# Patient Record
Sex: MALE | Race: WHITE | Employment: OTHER | URBAN - METROPOLITAN AREA
[De-identification: names, ages, dates, MRNs, and addresses within clinical notes are randomized per-mention and may not be internally consistent; named-entity substitution may affect disease eponyms.]

---

## 2017-02-18 PROBLEM — E87.20 LACTIC ACIDOSIS: Status: ACTIVE | Noted: 2017-02-18

## 2017-02-18 PROBLEM — K52.9 GASTROENTERITIS: Status: ACTIVE | Noted: 2017-02-18

## 2017-02-18 PROBLEM — E11.9 DIABETES (HCC): Status: ACTIVE | Noted: 2017-02-18

## 2017-02-18 PROBLEM — E83.42 HYPOMAGNESEMIA: Status: ACTIVE | Noted: 2017-02-18

## 2017-02-18 PROBLEM — D72.829 LEUKOCYTOSIS: Status: ACTIVE | Noted: 2017-02-18

## 2017-02-18 PROBLEM — E87.6 HYPOKALEMIA: Status: ACTIVE | Noted: 2017-02-18

## 2018-09-03 ENCOUNTER — HOSPITAL ENCOUNTER (INPATIENT)
Age: 73
LOS: 2 days | Discharge: HOME | DRG: 392 | End: 2018-09-06
Attending: EMERGENCY MEDICINE | Admitting: EMERGENCY MEDICINE
Payer: MEDICARE

## 2018-09-03 ENCOUNTER — APPOINTMENT (OUTPATIENT)
Dept: CT IMAGING | Age: 73
DRG: 392 | End: 2018-09-03
Payer: MEDICARE

## 2018-09-03 ENCOUNTER — APPOINTMENT (OUTPATIENT)
Dept: GENERAL RADIOLOGY | Age: 73
DRG: 392 | End: 2018-09-03
Payer: MEDICARE

## 2018-09-03 DIAGNOSIS — R11.14 BILIOUS VOMITING WITH NAUSEA: ICD-10-CM

## 2018-09-03 DIAGNOSIS — R14.0 ABDOMINAL DISTENTION: Primary | ICD-10-CM

## 2018-09-03 LAB
ABSOLUTE EOS #: 0.41 K/UL (ref 0–0.44)
ABSOLUTE IMMATURE GRANULOCYTE: <0.03 K/UL (ref 0–0.3)
ABSOLUTE LYMPH #: 1.51 K/UL (ref 1.1–3.7)
ABSOLUTE MONO #: 0.35 K/UL (ref 0.1–1.2)
ALBUMIN SERPL-MCNC: 4.2 G/DL (ref 3.5–5.2)
ALBUMIN/GLOBULIN RATIO: 1.4 (ref 1–2.5)
ALP BLD-CCNC: 100 U/L (ref 40–129)
ALT SERPL-CCNC: 29 U/L (ref 5–41)
ANION GAP SERPL CALCULATED.3IONS-SCNC: 16 MMOL/L (ref 9–17)
AST SERPL-CCNC: 27 U/L
BASOPHILS # BLD: 1 % (ref 0–2)
BASOPHILS ABSOLUTE: 0.06 K/UL (ref 0–0.2)
BILIRUB SERPL-MCNC: 1 MG/DL (ref 0.3–1.2)
BILIRUBIN DIRECT: 0.34 MG/DL
BILIRUBIN, INDIRECT: 0.66 MG/DL (ref 0–1)
BUN BLDV-MCNC: 8 MG/DL (ref 8–23)
BUN/CREAT BLD: ABNORMAL (ref 9–20)
CALCIUM SERPL-MCNC: 10 MG/DL (ref 8.6–10.4)
CHLORIDE BLD-SCNC: 101 MMOL/L (ref 98–107)
CO2: 23 MMOL/L (ref 20–31)
CREAT SERPL-MCNC: 0.55 MG/DL (ref 0.7–1.2)
DIFFERENTIAL TYPE: ABNORMAL
EOSINOPHILS RELATIVE PERCENT: 4 % (ref 1–4)
GFR AFRICAN AMERICAN: >60 ML/MIN
GFR NON-AFRICAN AMERICAN: >60 ML/MIN
GFR SERPL CREATININE-BSD FRML MDRD: ABNORMAL ML/MIN/{1.73_M2}
GFR SERPL CREATININE-BSD FRML MDRD: ABNORMAL ML/MIN/{1.73_M2}
GLOBULIN: ABNORMAL G/DL (ref 1.5–3.8)
GLUCOSE BLD-MCNC: 216 MG/DL (ref 70–99)
HCT VFR BLD CALC: 44.6 % (ref 40.7–50.3)
HEMOGLOBIN: 14.1 G/DL (ref 13–17)
IMMATURE GRANULOCYTES: 0 %
LIPASE: 26 U/L (ref 13–60)
LYMPHOCYTES # BLD: 14 % (ref 24–43)
MCH RBC QN AUTO: 25.4 PG (ref 25.2–33.5)
MCHC RBC AUTO-ENTMCNC: 31.6 G/DL (ref 28.4–34.8)
MCV RBC AUTO: 80.2 FL (ref 82.6–102.9)
MONOCYTES # BLD: 3 % (ref 3–12)
NRBC AUTOMATED: 0 PER 100 WBC
PDW BLD-RTO: 17.2 % (ref 11.8–14.4)
PLATELET # BLD: 218 K/UL (ref 138–453)
PLATELET ESTIMATE: ABNORMAL
PMV BLD AUTO: 10.1 FL (ref 8.1–13.5)
POTASSIUM SERPL-SCNC: 3.4 MMOL/L (ref 3.7–5.3)
RBC # BLD: 5.56 M/UL (ref 4.21–5.77)
RBC # BLD: ABNORMAL 10*6/UL
SEG NEUTROPHILS: 78 % (ref 36–65)
SEGMENTED NEUTROPHILS ABSOLUTE COUNT: 8.79 K/UL (ref 1.5–8.1)
SODIUM BLD-SCNC: 140 MMOL/L (ref 135–144)
TOTAL PROTEIN: 7.3 G/DL (ref 6.4–8.3)
WBC # BLD: 11.1 K/UL (ref 3.5–11.3)
WBC # BLD: ABNORMAL 10*3/UL

## 2018-09-03 PROCEDURE — 96374 THER/PROPH/DIAG INJ IV PUSH: CPT

## 2018-09-03 PROCEDURE — G0378 HOSPITAL OBSERVATION PER HR: HCPCS

## 2018-09-03 PROCEDURE — 80048 BASIC METABOLIC PNL TOTAL CA: CPT

## 2018-09-03 PROCEDURE — 6370000000 HC RX 637 (ALT 250 FOR IP): Performed by: EMERGENCY MEDICINE

## 2018-09-03 PROCEDURE — 74177 CT ABD & PELVIS W/CONTRAST: CPT

## 2018-09-03 PROCEDURE — 6360000002 HC RX W HCPCS: Performed by: STUDENT IN AN ORGANIZED HEALTH CARE EDUCATION/TRAINING PROGRAM

## 2018-09-03 PROCEDURE — 6360000004 HC RX CONTRAST MEDICATION: Performed by: STUDENT IN AN ORGANIZED HEALTH CARE EDUCATION/TRAINING PROGRAM

## 2018-09-03 PROCEDURE — 99285 EMERGENCY DEPT VISIT HI MDM: CPT

## 2018-09-03 PROCEDURE — 80076 HEPATIC FUNCTION PANEL: CPT

## 2018-09-03 PROCEDURE — 86140 C-REACTIVE PROTEIN: CPT

## 2018-09-03 PROCEDURE — 85651 RBC SED RATE NONAUTOMATED: CPT

## 2018-09-03 PROCEDURE — 74022 RADEX COMPL AQT ABD SERIES: CPT

## 2018-09-03 PROCEDURE — 85025 COMPLETE CBC W/AUTO DIFF WBC: CPT

## 2018-09-03 PROCEDURE — 83690 ASSAY OF LIPASE: CPT

## 2018-09-03 RX ORDER — SODIUM CHLORIDE 0.9 % (FLUSH) 0.9 %
10 SYRINGE (ML) INJECTION EVERY 12 HOURS SCHEDULED
Status: DISCONTINUED | OUTPATIENT
Start: 2018-09-03 | End: 2018-09-06 | Stop reason: HOSPADM

## 2018-09-03 RX ORDER — GLIMEPIRIDE 2 MG/1
4 TABLET ORAL
Status: DISCONTINUED | OUTPATIENT
Start: 2018-09-04 | End: 2018-09-06 | Stop reason: HOSPADM

## 2018-09-03 RX ORDER — OXYCODONE HYDROCHLORIDE AND ACETAMINOPHEN 5; 325 MG/1; MG/1
2 TABLET ORAL EVERY 4 HOURS PRN
Status: DISCONTINUED | OUTPATIENT
Start: 2018-09-03 | End: 2018-09-06 | Stop reason: HOSPADM

## 2018-09-03 RX ORDER — ONDANSETRON 4 MG/1
4 TABLET, ORALLY DISINTEGRATING ORAL EVERY 8 HOURS PRN
Status: DISCONTINUED | OUTPATIENT
Start: 2018-09-03 | End: 2018-09-06 | Stop reason: HOSPADM

## 2018-09-03 RX ORDER — MORPHINE SULFATE 4 MG/ML
4 INJECTION, SOLUTION INTRAMUSCULAR; INTRAVENOUS
Status: DISCONTINUED | OUTPATIENT
Start: 2018-09-03 | End: 2018-09-06 | Stop reason: HOSPADM

## 2018-09-03 RX ORDER — LISINOPRIL 20 MG/1
20 TABLET ORAL DAILY
Status: DISCONTINUED | OUTPATIENT
Start: 2018-09-04 | End: 2018-09-06 | Stop reason: HOSPADM

## 2018-09-03 RX ORDER — MORPHINE SULFATE 2 MG/ML
2 INJECTION, SOLUTION INTRAMUSCULAR; INTRAVENOUS
Status: DISCONTINUED | OUTPATIENT
Start: 2018-09-03 | End: 2018-09-06 | Stop reason: HOSPADM

## 2018-09-03 RX ORDER — CYCLOBENZAPRINE HCL 5 MG
5 TABLET ORAL 3 TIMES DAILY PRN
Status: DISCONTINUED | OUTPATIENT
Start: 2018-09-03 | End: 2018-09-06 | Stop reason: HOSPADM

## 2018-09-03 RX ORDER — SODIUM CHLORIDE 0.9 % (FLUSH) 0.9 %
10 SYRINGE (ML) INJECTION PRN
Status: DISCONTINUED | OUTPATIENT
Start: 2018-09-03 | End: 2018-09-06 | Stop reason: HOSPADM

## 2018-09-03 RX ORDER — AMLODIPINE BESYLATE 5 MG/1
5 TABLET ORAL DAILY
Status: DISCONTINUED | OUTPATIENT
Start: 2018-09-04 | End: 2018-09-06 | Stop reason: HOSPADM

## 2018-09-03 RX ORDER — OXYCODONE HYDROCHLORIDE AND ACETAMINOPHEN 5; 325 MG/1; MG/1
1 TABLET ORAL EVERY 4 HOURS PRN
Status: DISCONTINUED | OUTPATIENT
Start: 2018-09-03 | End: 2018-09-06 | Stop reason: HOSPADM

## 2018-09-03 RX ORDER — ACETAMINOPHEN 325 MG/1
650 TABLET ORAL EVERY 4 HOURS PRN
Status: DISCONTINUED | OUTPATIENT
Start: 2018-09-03 | End: 2018-09-06 | Stop reason: HOSPADM

## 2018-09-03 RX ORDER — FUROSEMIDE 40 MG/1
40 TABLET ORAL 2 TIMES DAILY
Status: DISCONTINUED | OUTPATIENT
Start: 2018-09-04 | End: 2018-09-06 | Stop reason: HOSPADM

## 2018-09-03 RX ORDER — ONDANSETRON 4 MG/1
4 TABLET, ORALLY DISINTEGRATING ORAL ONCE
Status: COMPLETED | OUTPATIENT
Start: 2018-09-03 | End: 2018-09-03

## 2018-09-03 RX ORDER — PANTOPRAZOLE SODIUM 40 MG/1
40 TABLET, DELAYED RELEASE ORAL
Status: DISCONTINUED | OUTPATIENT
Start: 2018-09-04 | End: 2018-09-06 | Stop reason: HOSPADM

## 2018-09-03 RX ORDER — FENTANYL CITRATE 50 UG/ML
50 INJECTION, SOLUTION INTRAMUSCULAR; INTRAVENOUS ONCE
Status: COMPLETED | OUTPATIENT
Start: 2018-09-03 | End: 2018-09-03

## 2018-09-03 RX ADMIN — FENTANYL CITRATE 50 MCG: 50 INJECTION INTRAMUSCULAR; INTRAVENOUS at 17:40

## 2018-09-03 RX ADMIN — ONDANSETRON 4 MG: 4 TABLET, ORALLY DISINTEGRATING ORAL at 17:41

## 2018-09-03 RX ADMIN — IOPAMIDOL 75 ML: 755 INJECTION, SOLUTION INTRAVENOUS at 18:38

## 2018-09-03 RX ADMIN — OXYCODONE HYDROCHLORIDE AND ACETAMINOPHEN 2 TABLET: 5; 325 TABLET ORAL at 23:02

## 2018-09-03 ASSESSMENT — ENCOUNTER SYMPTOMS
ABDOMINAL PAIN: 1
COLOR CHANGE: 0
WHEEZING: 0
ABDOMINAL DISTENTION: 1
VOMITING: 1
SHORTNESS OF BREATH: 0
NAUSEA: 1

## 2018-09-03 ASSESSMENT — PAIN DESCRIPTION - LOCATION
LOCATION: ABDOMEN
LOCATION: ABDOMEN

## 2018-09-03 ASSESSMENT — PAIN SCALES - GENERAL
PAINLEVEL_OUTOF10: 8

## 2018-09-03 ASSESSMENT — PAIN DESCRIPTION - DESCRIPTORS: DESCRIPTORS: CRAMPING

## 2018-09-03 ASSESSMENT — PAIN DESCRIPTION - PAIN TYPE
TYPE: ACUTE PAIN
TYPE: ACUTE PAIN

## 2018-09-03 NOTE — ED NOTES
Bed: 17  Expected date: 9/3/18  Expected time: 4:33 PM  Means of arrival: Ambulance  Comments:  Verna Walker RN  09/03/18 4512

## 2018-09-04 ENCOUNTER — APPOINTMENT (OUTPATIENT)
Dept: GENERAL RADIOLOGY | Age: 73
DRG: 392 | End: 2018-09-04
Payer: MEDICARE

## 2018-09-04 LAB
C-REACTIVE PROTEIN: 1.6 MG/L (ref 0–5)
SEDIMENTATION RATE, ERYTHROCYTE: 3 MM (ref 0–10)

## 2018-09-04 PROCEDURE — G0378 HOSPITAL OBSERVATION PER HR: HCPCS

## 2018-09-04 PROCEDURE — 6370000000 HC RX 637 (ALT 250 FOR IP): Performed by: INTERNAL MEDICINE

## 2018-09-04 PROCEDURE — 74022 RADEX COMPL AQT ABD SERIES: CPT

## 2018-09-04 PROCEDURE — 2580000003 HC RX 258: Performed by: INTERNAL MEDICINE

## 2018-09-04 PROCEDURE — 6370000000 HC RX 637 (ALT 250 FOR IP): Performed by: EMERGENCY MEDICINE

## 2018-09-04 PROCEDURE — 99221 1ST HOSP IP/OBS SF/LOW 40: CPT | Performed by: INTERNAL MEDICINE

## 2018-09-04 PROCEDURE — 2580000003 HC RX 258: Performed by: EMERGENCY MEDICINE

## 2018-09-04 RX ADMIN — Medication 10 ML: at 21:33

## 2018-09-04 RX ADMIN — FUROSEMIDE 40 MG: 40 TABLET ORAL at 08:40

## 2018-09-04 RX ADMIN — PANTOPRAZOLE SODIUM 40 MG: 40 TABLET, DELAYED RELEASE ORAL at 06:44

## 2018-09-04 RX ADMIN — OXYCODONE HYDROCHLORIDE AND ACETAMINOPHEN 2 TABLET: 5; 325 TABLET ORAL at 21:34

## 2018-09-04 RX ADMIN — Medication 10 ML: at 03:44

## 2018-09-04 RX ADMIN — AMLODIPINE BESYLATE 5 MG: 5 TABLET ORAL at 08:40

## 2018-09-04 RX ADMIN — LISINOPRIL 20 MG: 20 TABLET ORAL at 08:41

## 2018-09-04 RX ADMIN — GLIMEPIRIDE 4 MG: 2 TABLET ORAL at 06:44

## 2018-09-04 RX ADMIN — Medication 10 ML: at 06:45

## 2018-09-04 RX ADMIN — FUROSEMIDE 40 MG: 40 TABLET ORAL at 17:12

## 2018-09-04 RX ADMIN — Medication 10 ML: at 08:41

## 2018-09-04 RX ADMIN — OXYCODONE HYDROCHLORIDE AND ACETAMINOPHEN 2 TABLET: 5; 325 TABLET ORAL at 03:43

## 2018-09-04 RX ADMIN — POLYETHYLENE GLYCOL 3350, SODIUM SULFATE ANHYDROUS, SODIUM BICARBONATE, SODIUM CHLORIDE, POTASSIUM CHLORIDE 2000 ML: 236; 22.74; 6.74; 5.86; 2.97 POWDER, FOR SOLUTION ORAL at 17:12

## 2018-09-04 ASSESSMENT — PAIN SCALES - GENERAL: PAINLEVEL_OUTOF10: 8

## 2018-09-04 NOTE — PROGRESS NOTES
PROGRESS NOTE          PATIENT NAME: Julianna Shook  MEDICAL RECORD NO. [id number]  DATE: 2018  SURGEON: Dr. Zbigniew Hawkins: No primary care provider on file. HD: # 1    ASSESSMENT    Patient Active Problem List   Diagnosis    Supraclavicular adenopathy    Generalized lymphadenopathy    Follicular lymphoma grade II of intra-abdominal lymph nodes (HCC)    Hypokalemia    Hypomagnesemia    Gastroenteritis    Diabetes (HCC)    Leukocytosis    Lactic acidosis    Abdominal distention       MEDICAL DECISION MAKING AND PLAN    1. CT and XR of abdomen showed no evidence for obstruction  2. Recommending enema  3. Recommending GI consultation and colonoscopy for CT findings in sigmoid colon   4. General surgery will sign off, please call if any clinical changes or questions        SUBJECTIVE    Julianna Shook has improved since yesterday. No acute events overnight. Patient states his abdominal pain and distention have improved. He has not had a recent BM but has continue to pass flatus. He denies N, V, chest pain, SOB, calf pain/swelling, numbness, weakness, tingling.       OBJECTIVE  VITALS: Temp: Temp: 97.9 °F (36.6 °C)Temp  Av °F (36.7 °C)  Min: 97.9 °F (36.6 °C)  Max: 98 °F (85.2 °C) BP Systolic (20YLD), XFY:852 , Min:153 , YBX:400   Diastolic (20QXY), QJB:63, Min:58, Max:74   Pulse Pulse  Av  Min: 82  Max: 82 Resp Resp  Av.7  Min: 17  Max: 18 Pulse ox SpO2  Av.5 %  Min: 96 %  Max: 98 %  GENERAL: alert, cooperative, no distress  NEURO: No gross motor or sensory deficits, alert and oriented  HEENT: atraumatic, normocephalic  : deferred  LUNGS: clear to auscultation bilaterally- no wheezes, rales or rhonchi, normal air movement, no respiratory distress  HEART: normal rate, regular rhythm, no murmurs and no gallops  ABDOMEN: soft, non-tender, distended, normal bowel sounds, no masses or organomegaly, bowel sounds present in all 4 quadrants and no guarding or peritoneal signs

## 2018-09-04 NOTE — H&P
mucous membranes, uvula midline   NECK: supple, symmetric   BACK: symmetric   LUNGS: clear to auscultation bilaterally   CARDIOVASCULAR: regular rate and rhythm, no murmurs, rubs or gallops   ABDOMEN: Minimal tenderness to palpation in the bilateral lower quadrants. Abdomen is otherwise soft, nontender nondistended. No rebound or guarding. NEUROLOGIC:  MAEx4, no focal sensory or motor deficits   MUSCULOSKELETAL: No lower extremity edema. No calf tenderness to palpation. SKIN: no rash or wounds over exposed skin. DIFFERENTIAL DIAGNOSIS/MDM:   Abdominal Pain:  DDX: GERD, PUD, pancreatitis, cholecystitis, GB colic, cholangitis, Zhek-Bjcu-Ecizfq, ACS/ MI, pneumonia, SBO, DKA, AAA, mesenteric ischemia, perforated viscous, acute gastroenteritis, NSAP, pyelonephritis, kidney stone, appendicitis, hernia, D-TICS, testicular torsion, ectopic, ovarian torsion, ovarian cyst, PID, Mittelschmerz, period/ fibroid, UTI, constipation, epididymitis/ orchitis  Ransons criteria: WBC>16, age >49, glucose>200, AST>80, LDH>350  Evaluate for: EtOH abuse, ACS risk factors, point tenderness, rebound, guardingm Gamboa sign, GB US (stone, sono Gamboa, wall thick>3mm, CBD>6mm)    Ely Score: (appendicitis)  1. Abdominal pain (RLQ)     2  2. Anorexia (loss of appetite) or ketones in the urine  1  3. Nausea or vomiting      1  4. Migration to R iliac fossa     1  5. Rebound tenderness     1  6. Fever of 37.3 °C/ 99.1 F +     1  7. Leukocytosis > 28469 WBC    2  8.  Neutrophilia, or an increase in % of neutrophils in WBC 1  Total:  /10    (<3 no CT, 4-6 CT, 7-8 Surgery consult, 5-6 is consistent with diagnosis of acute appendicitis, 7-8 indicates a probable appendicitis, 9-10 indicates a very probable acute appendicitis)    BISAP Score: (pancreatitis)  BUN >25           1  Impaired mental status        1  SIRS criteria (HR >90, T 100.4, 36, RR >20/ CO2 <32, WBC >12, <4)  1  Age >60          1   Pleural Effusion          1  Total: lesion versus peristaltic contractions. -GI consulted, appreciate recommendations.  -Patient's symptoms are improved now. Will continued to treat with PO percocet, PO zofran, and IV morphine for breakthrough pain. · Continue home medications and pain control  · Monitor vitals, labs, and imaging  · DISPO: pending consults and clinical improvement    CONSULTS:    Alin 98 CONSULT TO INTERNAL MEDICINE  IP CONSULT TO GI    PROCEDURES:  Not indicated    PATIENT REFERRED TO:    Deepthi Del Rio MD  [id number]  [phone]        --  Princess Smith, DO Princess Smith    This dictation was generated by voice recognition computer software. Although all attempts are made to edit the dictation for accuracy, there may be errors in the transcription that are not intended.

## 2018-09-04 NOTE — FLOWSHEET NOTE
visited pt during rounding. Pt was lying in the bed and excepted the 's visit. Pt explored his feelings to what he is going through. Pt stated that his wife is at home over 4 hours drive. She may be coming with their daughter tomorrow. Pt shared the events that brought him to the hospital.  Pt is a  for over 40 years,  He thinks he needed to give it up.  offered prayer and pt received.  prayed and pt thanked him for the visit.

## 2018-09-04 NOTE — CONSULTS
daily  9/24/14   Historical Provider, MD   glimepiride (AMARYL) 4 MG tablet Take 4 mg by mouth every morning (before breakfast)  10/16/14   Historical Provider, MD   lidocaine-prilocaine (EMLA) 2.5-2.5 % cream Apply  topically as needed. Apply topically as needed. Historical Provider, MD   furosemide (LASIX) 40 MG tablet Take 40 mg by mouth 2 times daily. Historical Provider, MD   lisinopril (PRINIVIL;ZESTRIL) 20 MG tablet Take 20 mg by mouth daily. Historical Provider, MD   omeprazole (PRILOSEC) 20 MG capsule Take 20 mg by mouth daily. Historical Provider, MD   pioglitazone (ACTOS) 45 MG tablet Take 45 mg by mouth daily. Historical Provider, MD   sitagliptan-metformin (JANUMET)  MG per tablet Take 1 tablet by mouth 2 times daily (with meals). Historical Provider, MD     Scheduled Meds:   polyethylene glycol  2,000 mL Oral Once    [START ON 9/5/2018] polyethylene glycol  2,000 mL Oral Once    sodium chloride flush  10 mL Intravenous 2 times per day    amLODIPine  5 mg Oral Daily    furosemide  40 mg Oral BID    glimepiride  4 mg Oral QAM AC    lisinopril  20 mg Oral Daily    pantoprazole  40 mg Oral QAM AC    sodium chloride flush  10 mL Intravenous 2 times per day     Continuous Infusions:  PRN Meds:sodium chloride flush, cyclobenzaprine, sodium chloride flush, acetaminophen, morphine **OR** morphine, oxyCODONE-acetaminophen **OR** oxyCODONE-acetaminophen, ondansetron    Allergies   Allergen Reactions    Pineapple Swelling    Pineapple Flavor      Only the meat of the pineapple       SOCIAL HISTORY:   Social History     Social History    Marital status:      Spouse name: N/A    Number of children: N/A    Years of education: N/A     Occupational History    Not on file.      Social History Main Topics    Smoking status: Never Smoker    Smokeless tobacco: Never Used    Alcohol use No    Drug use: No    Sexual activity: Not on file     Other Topics Concern    Not
does not drink alcohol. reports that he does not use drugs. REVIEW OF SYSTEMS:     General Denies any fever or chills  HEENT Denies any diplopia, tinnitus or vertigo  Resp Denies any shortness of breath, cough or wheezing  Cardiac Denies any chest pain, palpitations, claudication or edema  GI one episode of loose stool, abdominal pain, nausea/vomiting    Denies any frequency, urgency, hesitancy or incontinence  Heme Denies bruising or bleeding easily  Endocrine history of diabetes   Neuro Denies any focal motor or sensory deficits  Skin Denies any rashes     PHYSICAL:     VITALS:  height is 5' 9\" (1.753 m) and weight is 211 lb (95.7 kg). His oral temperature is 98 °F (36.7 °C). His blood pressure is 155/66 (abnormal) and his pulse is 82. His respiration is 18 and oxygen saturation is 96%. CONSTITUTIONAL: awake, alert, cooperative, no apparent distress, and appears stated age and Alert and oriented times 3, no acute distress and cooperative to examination. HEENT: Head is normocephalic, atraumatic. EOMI, PERRLA  NECK: Soft, trachea midline and straight  LUNGS: Chest expands equally bilaterally upon respiration, no accessory muscle used. CARDIOVASCULAR: Heart sounds are normal.  Regular rate and rhythm without murmur, gallop or rub.   ABDOMEN: soft, distended, tender to palpation lower abdomen, no rebound or guarding, bowel sounds present  NEUROLOGIC:no focalizing motor or sensory deficits  EXTREMITIES: no cyanosis, clubbing or edema    LABS:     Recent Labs      09/03/18   1734   WBC  11.1   HGB  14.1   HCT  44.6   PLT  218   NA  140   K  3.4*   CL  101   CO2  23   BUN  8   CREATININE  0.55*   CALCIUM  10.0   AST  27   ALT  29   BILITOT  1.00   BILIDIR  0.34*     Recent Labs      09/03/18   1734   ALKPHOS  100   ALT  29   AST  27   BILITOT  1.00   BILIDIR  0.34*   LABALBU  4.2   LIPASE  26         RADIOLOGY:     Xr Acute Abd Series Chest 1 Vw    Result Date: 9/3/2018  EXAMINATION: TWO XRAY VIEWS OF THE

## 2018-09-04 NOTE — ED PROVIDER NOTES
[address] [facility]     Emergency Department     Faculty Attestation    I performed a history and physical examination of the patient and discussed management with the resident. I reviewed the residents note and agree with the documented findings and plan of care. Any areas of disagreement are noted on the chart. I was personally present for the key portions of any procedures. I have documented in the chart those procedures where I was not present during the key portions. I have reviewed the emergency nurses triage note. I agree with the chief complaint, past medical history, past surgical history, allergies, medications, social and family history as documented unless otherwise noted below. For Physician Assistant/ Nurse Practitioner cases/documentation I have personally evaluated this patient and have completed at least one if not all key elements of the E/M (history, physical exam, and MDM). Additional findings are as noted. I have personally seen and evaluated the patient. I find the patient's history and physical exam are consistent with the NP/PA documentation. I agree with the care provided, treatment rendered, disposition and follow-up plan. Obvious distended abdomen diffusely tender likely consistent with an acute obstruction at this time x-rays and CT pending      Critical Care     Yareli Robert M.D.   Attending Emergency  Physician              Rio Ellison MD  09/03/18 2339
administration of intravenous contrast. Multiplanar reformatted images are provided for review. Dose modulation, iterative reconstruction, and/or weight based adjustment of the mA/kV was utilized to reduce the radiation dose to as low as reasonably achievable. COMPARISON: None. HISTORY: ORDERING SYSTEM PROVIDED HISTORY: Lower abdominal pain. Distention TECHNOLOGIST PROVIDED HISTORY: IV Only Contrast FINDINGS: Lower Chest: The visualized heart and lungs show no acute abnormalities. Organs: Mild hepatic steatosis. Cholecystectomy. The spleen, pancreas, adrenal glands show no significant abnormalities. Horseshoe kidney noted. Normal enhancement of the kidneys. No hydronephrosis. GI/Bowel: Stomach shows no focal lesions. Small bowel shows no abnormal distention or focal lesions. Normal appendix. There is gaseous prominence of the colon without abnormal distention. Gas is seen throughout the colon. There is relative asymmetric narrowing of a 2.5 cm segment of proximal sigmoid colon where there is also suggestion of some wall thickening. While this could be due to a peristaltic with a developing wall lesion may not be entirely excluded. Pelvis: Urinary bladder grossly normal.  No suspicious pelvic mass. Peritoneum/Retroperitoneum: No ascites. No significant lymphadenopathy. Minor atherosclerotic disease. Bones/Soft Tissues: Sternotomy wires. No acute bony abnormality. Superficial soft tissues unremarkable. 1. Mild gaseous prominence of the colon without abnormal distention. Relative narrowing of a 2.5 cm segment of proximal sigmoid colon with suggestion of some wall thickening. Possibly peristaltic with but a developing mass lesion is not entirely excluded. 2. Mild hepatic steatosis. 3. Horseshoe kidneys. RECENT VITALS:     Temp: 98 °F (36.7 °C),  Pulse: 82, Resp: 18, BP: (!) 155/66, SpO2: 96 %    This patient is a 81 y.o.  Male with abdominal pain, abdominal distention, nausea, 4 episodes of bilious
abdominal distention, abdominal pain, nausea and vomiting. Genitourinary: Negative for dysuria, hematuria and testicular pain. Musculoskeletal: Negative for arthralgias and myalgias. Skin: Negative for color change and rash. Neurological: Negative for dizziness, light-headedness and headaches. PHYSICAL EXAM   (up to 7 for level 4, 8 or more for level 5)      INITIAL VITALS:   ED Triage Vitals [09/03/18 1645]   BP Temp Temp Source Pulse Resp SpO2 Height Weight   (!) 163/73 98 °F (36.7 °C) Oral 82 18 97 % 5' 9\" (1.753 m) 211 lb (95.7 kg)       Physical Exam   Constitutional: He is oriented to person, place, and time. He appears well-developed and well-nourished. HENT:   Head: Normocephalic and atraumatic. Cardiovascular: Normal rate, regular rhythm, normal heart sounds and intact distal pulses. Pulmonary/Chest: Effort normal and breath sounds normal. No respiratory distress. Abdominal:   Abdomen distended. Increased high-pitched bowel sounds. Diffuse tenderness to palpation worse in the right lower quadrant. Positive Rovsing sign. Significant guarding. Notable diastases recti vs ventral hernia. No CVA tenderness. Rectal exam negative for hemorrhoids, fissures, or occult blood. Genitourinary:   Genitourinary Comments: Testicular exam nontender. No horizontal riding lye either testicle. Neurological: He is alert and oriented to person, place, and time. Skin: Skin is warm and dry.        DIFFERENTIAL  DIAGNOSIS     PLAN (LABS / IMAGING / EKG):  Orders Placed This Encounter   Procedures    CT ABDOMEN PELVIS W IV CONTRAST    XR Acute Abd Series Chest 1 VW    Basic Metabolic Panel    CBC Auto Differential    Lipase    Hepatic Function Panel    Inpatient consult to General Surgery    Inpatient consult to Internal Medicine    Insert peripheral IV    PATIENT STATUS (FROM ED OR OR/PROCEDURAL) Inpatient       MEDICATIONS ORDERED:  Orders Placed This Encounter   Medications   

## 2018-09-05 ENCOUNTER — ANESTHESIA EVENT (OUTPATIENT)
Dept: ENDOSCOPY | Age: 73
DRG: 392 | End: 2018-09-05
Payer: MEDICARE

## 2018-09-05 ENCOUNTER — ANESTHESIA (OUTPATIENT)
Dept: ENDOSCOPY | Age: 73
DRG: 392 | End: 2018-09-05
Payer: MEDICARE

## 2018-09-05 VITALS — DIASTOLIC BLOOD PRESSURE: 41 MMHG | OXYGEN SATURATION: 99 % | SYSTOLIC BLOOD PRESSURE: 99 MMHG

## 2018-09-05 PROBLEM — R10.9 ABDOMINAL PAIN: Status: ACTIVE | Noted: 2018-09-05

## 2018-09-05 LAB
ABSOLUTE EOS #: 0.78 K/UL (ref 0–0.44)
ABSOLUTE IMMATURE GRANULOCYTE: <0.03 K/UL (ref 0–0.3)
ABSOLUTE LYMPH #: 2.03 K/UL (ref 1.1–3.7)
ABSOLUTE MONO #: 0.38 K/UL (ref 0.1–1.2)
ANION GAP SERPL CALCULATED.3IONS-SCNC: 13 MMOL/L (ref 9–17)
ANION GAP SERPL CALCULATED.3IONS-SCNC: 17 MMOL/L (ref 9–17)
BASOPHILS # BLD: 1 % (ref 0–2)
BASOPHILS ABSOLUTE: 0.06 K/UL (ref 0–0.2)
BUN BLDV-MCNC: 5 MG/DL (ref 8–23)
BUN BLDV-MCNC: 5 MG/DL (ref 8–23)
BUN/CREAT BLD: ABNORMAL (ref 9–20)
BUN/CREAT BLD: ABNORMAL (ref 9–20)
CALCIUM SERPL-MCNC: 8.5 MG/DL (ref 8.6–10.4)
CALCIUM SERPL-MCNC: 8.6 MG/DL (ref 8.6–10.4)
CHLORIDE BLD-SCNC: 101 MMOL/L (ref 98–107)
CHLORIDE BLD-SCNC: 98 MMOL/L (ref 98–107)
CO2: 28 MMOL/L (ref 20–31)
CO2: 29 MMOL/L (ref 20–31)
CREAT SERPL-MCNC: 0.48 MG/DL (ref 0.7–1.2)
CREAT SERPL-MCNC: 0.52 MG/DL (ref 0.7–1.2)
DIFFERENTIAL TYPE: ABNORMAL
EOSINOPHILS RELATIVE PERCENT: 13 % (ref 1–4)
GFR AFRICAN AMERICAN: >60 ML/MIN
GFR AFRICAN AMERICAN: >60 ML/MIN
GFR NON-AFRICAN AMERICAN: >60 ML/MIN
GFR NON-AFRICAN AMERICAN: >60 ML/MIN
GFR SERPL CREATININE-BSD FRML MDRD: ABNORMAL ML/MIN/{1.73_M2}
GLUCOSE BLD-MCNC: 111 MG/DL (ref 75–110)
GLUCOSE BLD-MCNC: 111 MG/DL (ref 75–110)
GLUCOSE BLD-MCNC: 150 MG/DL (ref 70–99)
GLUCOSE BLD-MCNC: 153 MG/DL (ref 75–110)
GLUCOSE BLD-MCNC: 94 MG/DL (ref 70–99)
HCT VFR BLD CALC: 42.1 % (ref 40.7–50.3)
HEMOGLOBIN: 13.5 G/DL (ref 13–17)
IMMATURE GRANULOCYTES: 0 %
LYMPHOCYTES # BLD: 33 % (ref 24–43)
MAGNESIUM: 1.4 MG/DL (ref 1.6–2.6)
MAGNESIUM: 1.7 MG/DL (ref 1.6–2.6)
MAGNESIUM: 1.9 MG/DL (ref 1.6–2.6)
MCH RBC QN AUTO: 25.7 PG (ref 25.2–33.5)
MCHC RBC AUTO-ENTMCNC: 32.1 G/DL (ref 28.4–34.8)
MCV RBC AUTO: 80 FL (ref 82.6–102.9)
MONOCYTES # BLD: 6 % (ref 3–12)
NRBC AUTOMATED: 0 PER 100 WBC
PDW BLD-RTO: 17.2 % (ref 11.8–14.4)
PLATELET # BLD: 190 K/UL (ref 138–453)
PLATELET ESTIMATE: ABNORMAL
PMV BLD AUTO: 9.9 FL (ref 8.1–13.5)
POTASSIUM SERPL-SCNC: 2.7 MMOL/L (ref 3.7–5.3)
POTASSIUM SERPL-SCNC: 2.9 MMOL/L (ref 3.7–5.3)
POTASSIUM SERPL-SCNC: 3.4 MMOL/L (ref 3.7–5.3)
RBC # BLD: 5.26 M/UL (ref 4.21–5.77)
RBC # BLD: ABNORMAL 10*6/UL
SEG NEUTROPHILS: 47 % (ref 36–65)
SEGMENTED NEUTROPHILS ABSOLUTE COUNT: 2.92 K/UL (ref 1.5–8.1)
SODIUM BLD-SCNC: 143 MMOL/L (ref 135–144)
SODIUM BLD-SCNC: 143 MMOL/L (ref 135–144)
WBC # BLD: 6.2 K/UL (ref 3.5–11.3)
WBC # BLD: ABNORMAL 10*3/UL

## 2018-09-05 PROCEDURE — 7100000010 HC PHASE II RECOVERY - FIRST 15 MIN: Performed by: INTERNAL MEDICINE

## 2018-09-05 PROCEDURE — 3700000001 HC ADD 15 MINUTES (ANESTHESIA): Performed by: INTERNAL MEDICINE

## 2018-09-05 PROCEDURE — 88305 TISSUE EXAM BY PATHOLOGIST: CPT

## 2018-09-05 PROCEDURE — 76937 US GUIDE VASCULAR ACCESS: CPT

## 2018-09-05 PROCEDURE — 80048 BASIC METABOLIC PNL TOTAL CA: CPT

## 2018-09-05 PROCEDURE — 2580000003 HC RX 258: Performed by: INTERNAL MEDICINE

## 2018-09-05 PROCEDURE — 2500000003 HC RX 250 WO HCPCS: Performed by: NURSE ANESTHETIST, CERTIFIED REGISTERED

## 2018-09-05 PROCEDURE — 2709999900 HC NON-CHARGEABLE SUPPLY: Performed by: INTERNAL MEDICINE

## 2018-09-05 PROCEDURE — 36415 COLL VENOUS BLD VENIPUNCTURE: CPT

## 2018-09-05 PROCEDURE — 6370000000 HC RX 637 (ALT 250 FOR IP): Performed by: STUDENT IN AN ORGANIZED HEALTH CARE EDUCATION/TRAINING PROGRAM

## 2018-09-05 PROCEDURE — 2580000003 HC RX 258: Performed by: NURSE ANESTHETIST, CERTIFIED REGISTERED

## 2018-09-05 PROCEDURE — 3700000000 HC ANESTHESIA ATTENDED CARE: Performed by: INTERNAL MEDICINE

## 2018-09-05 PROCEDURE — 0DBN8ZX EXCISION OF SIGMOID COLON, VIA NATURAL OR ARTIFICIAL OPENING ENDOSCOPIC, DIAGNOSTIC: ICD-10-PCS | Performed by: INTERNAL MEDICINE

## 2018-09-05 PROCEDURE — 6360000002 HC RX W HCPCS: Performed by: NURSE ANESTHETIST, CERTIFIED REGISTERED

## 2018-09-05 PROCEDURE — 6360000002 HC RX W HCPCS: Performed by: STUDENT IN AN ORGANIZED HEALTH CARE EDUCATION/TRAINING PROGRAM

## 2018-09-05 PROCEDURE — 84132 ASSAY OF SERUM POTASSIUM: CPT

## 2018-09-05 PROCEDURE — 2580000003 HC RX 258: Performed by: EMERGENCY MEDICINE

## 2018-09-05 PROCEDURE — 1200000000 HC SEMI PRIVATE

## 2018-09-05 PROCEDURE — G0378 HOSPITAL OBSERVATION PER HR: HCPCS

## 2018-09-05 PROCEDURE — 6360000002 HC RX W HCPCS: Performed by: EMERGENCY MEDICINE

## 2018-09-05 PROCEDURE — 6370000000 HC RX 637 (ALT 250 FOR IP): Performed by: EMERGENCY MEDICINE

## 2018-09-05 PROCEDURE — 82947 ASSAY GLUCOSE BLOOD QUANT: CPT

## 2018-09-05 PROCEDURE — 85025 COMPLETE CBC W/AUTO DIFF WBC: CPT

## 2018-09-05 PROCEDURE — 83735 ASSAY OF MAGNESIUM: CPT

## 2018-09-05 PROCEDURE — 0DBL8ZX EXCISION OF TRANSVERSE COLON, VIA NATURAL OR ARTIFICIAL OPENING ENDOSCOPIC, DIAGNOSTIC: ICD-10-PCS | Performed by: INTERNAL MEDICINE

## 2018-09-05 PROCEDURE — 45380 COLONOSCOPY AND BIOPSY: CPT | Performed by: INTERNAL MEDICINE

## 2018-09-05 PROCEDURE — 3609010300 HC COLONOSCOPY W/BIOPSY SINGLE/MULTIPLE: Performed by: INTERNAL MEDICINE

## 2018-09-05 PROCEDURE — 7100000011 HC PHASE II RECOVERY - ADDTL 15 MIN: Performed by: INTERNAL MEDICINE

## 2018-09-05 RX ORDER — POTASSIUM CHLORIDE 20 MEQ/1
40 TABLET, EXTENDED RELEASE ORAL ONCE
Status: COMPLETED | OUTPATIENT
Start: 2018-09-06 | End: 2018-09-06

## 2018-09-05 RX ORDER — MAGNESIUM SULFATE 1 G/100ML
2 INJECTION INTRAVENOUS
Status: DISCONTINUED | OUTPATIENT
Start: 2018-09-05 | End: 2018-09-05

## 2018-09-05 RX ORDER — MAGNESIUM SULFATE 1 G/100ML
1 INJECTION INTRAVENOUS
Status: DISPENSED | OUTPATIENT
Start: 2018-09-05 | End: 2018-09-05

## 2018-09-05 RX ORDER — FENTANYL CITRATE 50 UG/ML
25 INJECTION, SOLUTION INTRAMUSCULAR; INTRAVENOUS EVERY 5 MIN PRN
Status: DISCONTINUED | OUTPATIENT
Start: 2018-09-05 | End: 2018-09-06 | Stop reason: HOSPADM

## 2018-09-05 RX ORDER — PROPOFOL 10 MG/ML
INJECTION, EMULSION INTRAVENOUS PRN
Status: DISCONTINUED | OUTPATIENT
Start: 2018-09-05 | End: 2018-09-05 | Stop reason: SDUPTHER

## 2018-09-05 RX ORDER — LABETALOL HYDROCHLORIDE 5 MG/ML
5 INJECTION, SOLUTION INTRAVENOUS EVERY 10 MIN PRN
Status: DISCONTINUED | OUTPATIENT
Start: 2018-09-05 | End: 2018-09-06 | Stop reason: HOSPADM

## 2018-09-05 RX ORDER — MAGNESIUM SULFATE 1 G/100ML
1 INJECTION INTRAVENOUS
Status: ACTIVE | OUTPATIENT
Start: 2018-09-05 | End: 2018-09-05

## 2018-09-05 RX ORDER — SODIUM CHLORIDE 9 MG/ML
INJECTION, SOLUTION INTRAVENOUS CONTINUOUS PRN
Status: DISCONTINUED | OUTPATIENT
Start: 2018-09-05 | End: 2018-09-05 | Stop reason: SDUPTHER

## 2018-09-05 RX ORDER — ONDANSETRON 2 MG/ML
4 INJECTION INTRAMUSCULAR; INTRAVENOUS
Status: ACTIVE | OUTPATIENT
Start: 2018-09-05 | End: 2018-09-05

## 2018-09-05 RX ORDER — OXYCODONE HYDROCHLORIDE AND ACETAMINOPHEN 5; 325 MG/1; MG/1
1 TABLET ORAL PRN
Status: ACTIVE | OUTPATIENT
Start: 2018-09-05 | End: 2018-09-05

## 2018-09-05 RX ORDER — PROPOFOL 10 MG/ML
INJECTION, EMULSION INTRAVENOUS CONTINUOUS PRN
Status: DISCONTINUED | OUTPATIENT
Start: 2018-09-05 | End: 2018-09-05 | Stop reason: SDUPTHER

## 2018-09-05 RX ORDER — MAGNESIUM SULFATE 1 G/100ML
2 INJECTION INTRAVENOUS ONCE
Status: COMPLETED | OUTPATIENT
Start: 2018-09-05 | End: 2018-09-06

## 2018-09-05 RX ORDER — POTASSIUM CHLORIDE 7.45 MG/ML
10 INJECTION INTRAVENOUS PRN
Status: DISCONTINUED | OUTPATIENT
Start: 2018-09-05 | End: 2018-09-06 | Stop reason: HOSPADM

## 2018-09-05 RX ORDER — MORPHINE SULFATE 2 MG/ML
2 INJECTION, SOLUTION INTRAMUSCULAR; INTRAVENOUS EVERY 5 MIN PRN
Status: DISCONTINUED | OUTPATIENT
Start: 2018-09-05 | End: 2018-09-06 | Stop reason: HOSPADM

## 2018-09-05 RX ORDER — DIPHENHYDRAMINE HYDROCHLORIDE 50 MG/ML
12.5 INJECTION INTRAMUSCULAR; INTRAVENOUS
Status: ACTIVE | OUTPATIENT
Start: 2018-09-05 | End: 2018-09-05

## 2018-09-05 RX ORDER — LIDOCAINE HYDROCHLORIDE 10 MG/ML
INJECTION, SOLUTION INFILTRATION; PERINEURAL PRN
Status: DISCONTINUED | OUTPATIENT
Start: 2018-09-05 | End: 2018-09-05 | Stop reason: SDUPTHER

## 2018-09-05 RX ORDER — POTASSIUM CHLORIDE 20 MEQ/1
40 TABLET, EXTENDED RELEASE ORAL ONCE
Status: COMPLETED | OUTPATIENT
Start: 2018-09-05 | End: 2018-09-05

## 2018-09-05 RX ORDER — POTASSIUM CHLORIDE 20MEQ/15ML
20 LIQUID (ML) ORAL ONCE
Status: COMPLETED | OUTPATIENT
Start: 2018-09-05 | End: 2018-09-05

## 2018-09-05 RX ORDER — OXYCODONE HYDROCHLORIDE AND ACETAMINOPHEN 5; 325 MG/1; MG/1
2 TABLET ORAL PRN
Status: ACTIVE | OUTPATIENT
Start: 2018-09-05 | End: 2018-09-05

## 2018-09-05 RX ADMIN — GLIMEPIRIDE 4 MG: 2 TABLET ORAL at 06:01

## 2018-09-05 RX ADMIN — POTASSIUM CHLORIDE 40 MEQ: 1500 TABLET, EXTENDED RELEASE ORAL at 23:37

## 2018-09-05 RX ADMIN — PROPOFOL 50 MG: 10 INJECTION, EMULSION INTRAVENOUS at 16:45

## 2018-09-05 RX ADMIN — FUROSEMIDE 40 MG: 40 TABLET ORAL at 08:29

## 2018-09-05 RX ADMIN — POTASSIUM CHLORIDE 20 MEQ: 40 SOLUTION ORAL at 14:19

## 2018-09-05 RX ADMIN — Medication 10 ML: at 21:00

## 2018-09-05 RX ADMIN — FUROSEMIDE 40 MG: 40 TABLET ORAL at 18:58

## 2018-09-05 RX ADMIN — LIDOCAINE HYDROCHLORIDE 30 MG: 10 INJECTION, SOLUTION INFILTRATION; PERINEURAL at 16:45

## 2018-09-05 RX ADMIN — PROPOFOL 125 MCG/KG/MIN: 10 INJECTION, EMULSION INTRAVENOUS at 16:45

## 2018-09-05 RX ADMIN — POTASSIUM CHLORIDE 10 MEQ: 10 INJECTION, SOLUTION INTRAVENOUS at 14:19

## 2018-09-05 RX ADMIN — POTASSIUM CHLORIDE 10 MEQ: 10 INJECTION, SOLUTION INTRAVENOUS at 11:12

## 2018-09-05 RX ADMIN — MAGNESIUM SULFATE IN DEXTROSE 1 G: 10 INJECTION, SOLUTION INTRAVENOUS at 12:36

## 2018-09-05 RX ADMIN — SODIUM CHLORIDE: 9 INJECTION, SOLUTION INTRAVENOUS at 16:32

## 2018-09-05 RX ADMIN — MAGNESIUM SULFATE HEPTAHYDRATE 2 G: 1 INJECTION, SOLUTION INTRAVENOUS at 23:37

## 2018-09-05 RX ADMIN — PHENYLEPHRINE HYDROCHLORIDE 100 MCG: 10 INJECTION INTRAVENOUS at 17:03

## 2018-09-05 RX ADMIN — POTASSIUM CHLORIDE 10 MEQ: 10 INJECTION, SOLUTION INTRAVENOUS at 18:03

## 2018-09-05 RX ADMIN — AMLODIPINE BESYLATE 5 MG: 5 TABLET ORAL at 14:08

## 2018-09-05 RX ADMIN — PHENYLEPHRINE HYDROCHLORIDE 100 MCG: 10 INJECTION INTRAVENOUS at 16:58

## 2018-09-05 RX ADMIN — MAGNESIUM SULFATE HEPTAHYDRATE 1 G: 1 INJECTION, SOLUTION INTRAVENOUS at 11:07

## 2018-09-05 RX ADMIN — PANTOPRAZOLE SODIUM 40 MG: 40 TABLET, DELAYED RELEASE ORAL at 06:01

## 2018-09-05 RX ADMIN — Medication 10 ML: at 08:29

## 2018-09-05 RX ADMIN — LISINOPRIL 20 MG: 20 TABLET ORAL at 14:08

## 2018-09-05 RX ADMIN — POTASSIUM CHLORIDE 10 MEQ: 10 INJECTION, SOLUTION INTRAVENOUS at 09:01

## 2018-09-05 RX ADMIN — PHENYLEPHRINE HYDROCHLORIDE 100 MCG: 10 INJECTION INTRAVENOUS at 16:52

## 2018-09-05 ASSESSMENT — PAIN SCALES - GENERAL
PAINLEVEL_OUTOF10: 0

## 2018-09-05 ASSESSMENT — LIFESTYLE VARIABLES: SMOKING_STATUS: 0

## 2018-09-05 ASSESSMENT — PULMONARY FUNCTION TESTS
PIF_VALUE: 1
PIF_VALUE: 0
PIF_VALUE: 0
PIF_VALUE: 1
PIF_VALUE: 1
PIF_VALUE: 0
PIF_VALUE: 1
PIF_VALUE: 0
PIF_VALUE: 1
PIF_VALUE: 0
PIF_VALUE: 1
PIF_VALUE: 0
PIF_VALUE: 0
PIF_VALUE: 1

## 2018-09-05 NOTE — ANESTHESIA PRE PROCEDURE
Department of Anesthesiology  Preprocedure Note       Name:  Gaviota Bates   Age:  81 y.o.  :  1945                                          MRN:  [id number]         Date:  2018      Surgeon: Belgica Rai):  Socorro Dunbar MD    Procedure: Procedure(s):  COLONOSCOPY    Department of Anesthesiology  Pre-Anesthesia Evaluation/Consultation         Name:  Gaviota Bates                                         Age:  81 y.o.   MRN:  [id number]             Medications  Current Facility-Administered Medications   Medication Dose Route Frequency Provider Last Rate Last Dose    potassium chloride 10 mEq/100 mL IVPB (Peripheral Line)  10 mEq Intravenous PRN Bushra Granda  mL/hr at 18 1419 10 mEq at 18 1419    polyethylene glycol (GoLYTELY) solution 2,000 mL  2,000 mL Oral Once YUAN Calderon - CNP        sodium chloride flush 0.9 % injection 10 mL  10 mL Intravenous 2 times per day Elvis Hines MD   10 mL at 18 0645    sodium chloride flush 0.9 % injection 10 mL  10 mL Intravenous PRN Elvis Hines MD        amLODIPine (NORVASC) tablet 5 mg  5 mg Oral Daily Bushra Granda MD   5 mg at 18 1408    cyclobenzaprine (FLEXERIL) tablet 5 mg  5 mg Oral TID PRN Bushra Granda MD        furosemide (LASIX) tablet 40 mg  40 mg Oral BID Bushra Granda MD   40 mg at 18 0829    glimepiride (AMARYL) tablet 4 mg  4 mg Oral QAM AC Bushra Granda MD   4 mg at 18 0601    lisinopril (PRINIVIL;ZESTRIL) tablet 20 mg  20 mg Oral Daily Bushra Granda MD   20 mg at 18 1408    pantoprazole (PROTONIX) tablet 40 mg  40 mg Oral QAM  Bushra Granda MD   40 mg at 18 0601    sodium chloride flush 0.9 % injection 10 mL  10 mL Intravenous 2 times per day Bushra Granda MD   10 mL at 18 0829    sodium chloride flush 0.9 % injection 10 mL  10 mL Intravenous PRN Bushra Granda MD        acetaminophen (TYLENOL) tablet 650 mg  650 mg Oral Q4H PRN [facility]    Radiology (If Applicable)    Cardiac Testing (If Applicable)     EKG (If Applicable) inf ischemia            Medications prior to admission:   Prior to Admission medications    Medication Sig Start Date End Date Taking? Authorizing Provider   cyclobenzaprine (FLEXERIL) 5 MG tablet Take 1 tablet by mouth 3 times daily as needed for Muscle spasms 5/23/16   YUAN Garcia CNP   TANZEUM 30 MG PEN once a week  1/20/16   Historical Provider, MD   ketoconazole (NIZORAL) 2 % cream Apply topically daily. 4/23/15   Dave Noble MD   amLODIPine (NORVASC) 5 MG tablet Take 5 mg by mouth daily  9/24/14   Historical Provider, MD   glimepiride (AMARYL) 4 MG tablet Take 4 mg by mouth every morning (before breakfast)  10/16/14   Historical Provider, MD   lidocaine-prilocaine (EMLA) 2.5-2.5 % cream Apply  topically as needed. Apply topically as needed. Historical Provider, MD   furosemide (LASIX) 40 MG tablet Take 40 mg by mouth 2 times daily. Historical Provider, MD   lisinopril (PRINIVIL;ZESTRIL) 20 MG tablet Take 20 mg by mouth daily. Historical Provider, MD   omeprazole (PRILOSEC) 20 MG capsule Take 20 mg by mouth daily. Historical Provider, MD   pioglitazone (ACTOS) 45 MG tablet Take 45 mg by mouth daily. Historical Provider, MD   sitagliptan-metformin (JANUMET)  MG per tablet Take 1 tablet by mouth 2 times daily (with meals).       Historical Provider, MD       Current medications:    Current Facility-Administered Medications   Medication Dose Route Frequency Provider Last Rate Last Dose    potassium chloride 10 mEq/100 mL IVPB (Peripheral Line)  10 mEq Intravenous PRN Mir Driscoll  mL/hr at 09/05/18 1419 10 mEq at 09/05/18 1419    polyethylene glycol (GoLYTELY) solution 2,000 mL  2,000 mL Oral Once YUAN Vuong CNP        sodium chloride flush 0.9 % injection 10 mL  10 mL Intravenous 2 times per day Megan Kaur MD   10 mL at 09/04/18 0694    sodium chloride flush 0.9 % injection 10 mL  10 mL Intravenous PRN Odalis Little MD        amLODIPine (NORVASC) tablet 5 mg  5 mg Oral Daily Genesis Au MD   5 mg at 09/05/18 1408    cyclobenzaprine (FLEXERIL) tablet 5 mg  5 mg Oral TID PRN Genesis Au MD        furosemide (LASIX) tablet 40 mg  40 mg Oral BID Genesis Au MD   40 mg at 09/05/18 0829    glimepiride (AMARYL) tablet 4 mg  4 mg Oral QAM AC Genesis Au MD   4 mg at 09/05/18 0601    lisinopril (PRINIVIL;ZESTRIL) tablet 20 mg  20 mg Oral Daily Genesis Au MD   20 mg at 09/05/18 1408    pantoprazole (PROTONIX) tablet 40 mg  40 mg Oral QAM AC Genesis Au MD   40 mg at 09/05/18 0601    sodium chloride flush 0.9 % injection 10 mL  10 mL Intravenous 2 times per day Genesis Au MD   10 mL at 09/05/18 0829    sodium chloride flush 0.9 % injection 10 mL  10 mL Intravenous PRN Genesis Au MD        acetaminophen (TYLENOL) tablet 650 mg  650 mg Oral Q4H PRN Genesis Au MD        morphine injection 2 mg  2 mg Intravenous Q2H PRN Genesis Au MD        Or   Ham[place] Srivastava morphine (PF) injection 4 mg  4 mg Intravenous Q2H PRN Genesis Au MD        oxyCODONE-acetaminophen (PERCOCET) 5-325 MG per tablet 1 tablet  1 tablet Oral Q4H PRN Genesis Au MD        Or    oxyCODONE-acetaminophen (PERCOCET) 5-325 MG per tablet 2 tablet  2 tablet Oral Q4H PRN Genesis Au MD   2 tablet at 09/04/18 2134    ondansetron (ZOFRAN-ODT) disintegrating tablet 4 mg  4 mg Oral Q8H PRN Genesis Au MD           Allergies:     Allergies   Allergen Reactions    Pineapple Swelling    Pineapple Flavor      Only the meat of the pineapple       Problem List:    Patient Active Problem List   Diagnosis Code    Supraclavicular adenopathy R59.0    Generalized lymphadenopathy S26.9    Follicular lymphoma grade II of intra-abdominal lymph nodes (HCC) C82.13    Hypokalemia E87.6    Hypomagnesemia E83.42    Gastroenteritis GFRAA >60 09/05/2018    GFRAA >60 11/06/2014    AGRATIO 2 05/04/2017    LABGLOM >60 09/05/2018    GLUCOSE 150 09/05/2018    GLUCOSE 140 12/15/2016    PROT 7.3 09/03/2018    PROT 6.6 12/15/2016    CALCIUM 8.5 09/05/2018    BILITOT 1.00 09/03/2018    ALKPHOS 100 09/03/2018    AST 27 09/03/2018    ALT 29 09/03/2018       POC Tests:   Recent Labs      09/05/18   1224   POCGLU  153*       Coags:   Lab Results   Component Value Date    PROTIME 12.9 05/14/2012    INR 1.14 05/14/2012    APTT 33.0 05/14/2012       HCG (If Applicable): No results found for: PREGTESTUR, PREGSERUM, HCG, HCGQUANT     ABGs: No results found for: PHART, PO2ART, BBL7ELS, DXD9VRD, BEART, X3BBSJLT     Type & Screen (If Applicable):  No results found for: LABABO, LABRH    Anesthesia Evaluation   no history of anesthetic complications:   Airway: Mallampati: II     Neck ROM: full   Dental:          Pulmonary:   (+) sleep apnea: on CPAP,      (-) not a current smoker                           Cardiovascular:    (+) hypertension:, CAD:, CABG/stent:,                   Neuro/Psych:   (+) neuromuscular disease:,             GI/Hepatic/Renal:   (+) GERD:,          ROS comment: colitis. Endo/Other:    (+) DiabetesType II DM, , .                  ROS comment: lynphoma Abdominal:           Vascular:                                        Anesthesia Plan      MAC     ASA 4     (Asa 4 cad)  Induction: intravenous.                           Josefina Davis MD   9/5/2018

## 2018-09-05 NOTE — PROGRESS NOTES
OBS/CDU   RESIDENT NOTE      Patients PCP is: No primary care provider on file. SUBJECTIVE    Pt to go for colonoscopy today. No acute events overnight. Has been able to tolerate a full diet without nausea or vomiting. The patient is urinating on his own and is passing flatus. Denies fever, chills, nausea, vomiting, chest pain, shortness of breath, abdominal pain, focal weakness, numbness, tingling, urinary/bowel symptoms, vision changes, visual hallucinations, or headache. PHYSICAL EXAM      General: NAD, AO X 3  Heent: EMOI, PERRL  Neck: SUPPLE, NO JVD  Cardiovascular: RRR, S1S2  Pulmonary: CTAB, NO SOB  Abdomen: SOFT, NTTP, ND, +BS  Extremities: +2/4 PULSES DISTAL, NO SWELLING  Neuro / Psych: NO NUMBNESS OR TINGLING, MENTATION AT BASELINE    PERTINENT TEST /EXAMS      I have reviewed all available laboratory results. MEDICATIONS CURRENT     [START ON 9/5/2018] polyethylene glycol (GoLYTELY) solution 2,000 mL Once   sodium chloride flush 0.9 % injection 10 mL 2 times per day   sodium chloride flush 0.9 % injection 10 mL PRN   amLODIPine (NORVASC) tablet 5 mg Daily   cyclobenzaprine (FLEXERIL) tablet 5 mg TID PRN   furosemide (LASIX) tablet 40 mg BID   glimepiride (AMARYL) tablet 4 mg QAM AC   lisinopril (PRINIVIL;ZESTRIL) tablet 20 mg Daily   pantoprazole (PROTONIX) tablet 40 mg QAM AC   sodium chloride flush 0.9 % injection 10 mL 2 times per day   sodium chloride flush 0.9 % injection 10 mL PRN   acetaminophen (TYLENOL) tablet 650 mg Q4H PRN   morphine injection 2 mg Q2H PRN   Or    morphine (PF) injection 4 mg Q2H PRN   oxyCODONE-acetaminophen (PERCOCET) 5-325 MG per tablet 1 tablet Q4H PRN   Or    oxyCODONE-acetaminophen (PERCOCET) 5-325 MG per tablet 2 tablet Q4H PRN   ondansetron (ZOFRAN-ODT) disintegrating tablet 4 mg Q8H PRN       All medication charted and reviewed.     CONSULTS      IP CONSULT TO GENERAL SURGERY  IP CONSULT TO INTERNAL MEDICINE  IP CONSULT TO GI    ASSESSMENT/PLAN

## 2018-09-06 VITALS
TEMPERATURE: 97.9 F | WEIGHT: 207.5 LBS | OXYGEN SATURATION: 96 % | BODY MASS INDEX: 30.73 KG/M2 | SYSTOLIC BLOOD PRESSURE: 128 MMHG | RESPIRATION RATE: 14 BRPM | DIASTOLIC BLOOD PRESSURE: 70 MMHG | HEIGHT: 69 IN | HEART RATE: 84 BPM

## 2018-09-06 LAB
GLUCOSE BLD-MCNC: 142 MG/DL (ref 75–110)
GLUCOSE BLD-MCNC: 272 MG/DL (ref 75–110)
GLUCOSE BLD-MCNC: 89 MG/DL (ref 75–110)
MAGNESIUM: 2 MG/DL (ref 1.6–2.6)
POTASSIUM SERPL-SCNC: 3.4 MMOL/L (ref 3.7–5.3)

## 2018-09-06 PROCEDURE — 36415 COLL VENOUS BLD VENIPUNCTURE: CPT

## 2018-09-06 PROCEDURE — 99232 SBSQ HOSP IP/OBS MODERATE 35: CPT | Performed by: INTERNAL MEDICINE

## 2018-09-06 PROCEDURE — 82947 ASSAY GLUCOSE BLOOD QUANT: CPT

## 2018-09-06 PROCEDURE — 6370000000 HC RX 637 (ALT 250 FOR IP): Performed by: STUDENT IN AN ORGANIZED HEALTH CARE EDUCATION/TRAINING PROGRAM

## 2018-09-06 PROCEDURE — G0378 HOSPITAL OBSERVATION PER HR: HCPCS

## 2018-09-06 PROCEDURE — 84132 ASSAY OF SERUM POTASSIUM: CPT

## 2018-09-06 PROCEDURE — 83735 ASSAY OF MAGNESIUM: CPT

## 2018-09-06 PROCEDURE — 6370000000 HC RX 637 (ALT 250 FOR IP): Performed by: EMERGENCY MEDICINE

## 2018-09-06 RX ORDER — POTASSIUM CHLORIDE 20 MEQ/1
40 TABLET, EXTENDED RELEASE ORAL ONCE
Status: COMPLETED | OUTPATIENT
Start: 2018-09-06 | End: 2018-09-06

## 2018-09-06 RX ORDER — METRONIDAZOLE 500 MG/1
500 TABLET ORAL 2 TIMES DAILY
Qty: 14 TABLET | Refills: 0 | Status: SHIPPED | OUTPATIENT
Start: 2018-09-06 | End: 2018-09-06 | Stop reason: HOSPADM

## 2018-09-06 RX ORDER — GLIPIZIDE 5 MG/1
5 TABLET ORAL
COMMUNITY

## 2018-09-06 RX ORDER — AMLODIPINE BESYLATE 5 MG/1
5 TABLET ORAL DAILY
Qty: 30 TABLET | Refills: 3 | Status: SHIPPED | OUTPATIENT
Start: 2018-09-07 | End: 2020-01-28

## 2018-09-06 RX ORDER — MOXIFLOXACIN HYDROCHLORIDE 400 MG/1
400 TABLET ORAL DAILY
Qty: 7 TABLET | Refills: 0 | Status: SHIPPED | OUTPATIENT
Start: 2018-09-06 | End: 2018-09-13

## 2018-09-06 RX ORDER — METOPROLOL TARTRATE 50 MG/1
50 TABLET, FILM COATED ORAL 2 TIMES DAILY
COMMUNITY

## 2018-09-06 RX ORDER — METOPROLOL TARTRATE 50 MG/1
50 TABLET, FILM COATED ORAL 2 TIMES DAILY
Status: DISCONTINUED | OUTPATIENT
Start: 2018-09-06 | End: 2018-09-06 | Stop reason: HOSPADM

## 2018-09-06 RX ORDER — GABAPENTIN 300 MG/1
300 CAPSULE ORAL EVERY EVENING
COMMUNITY

## 2018-09-06 RX ORDER — ATORVASTATIN CALCIUM 40 MG/1
40 TABLET, FILM COATED ORAL DAILY
COMMUNITY

## 2018-09-06 RX ADMIN — PANTOPRAZOLE SODIUM 40 MG: 40 TABLET, DELAYED RELEASE ORAL at 06:07

## 2018-09-06 RX ADMIN — LISINOPRIL 10 MG: 20 TABLET ORAL at 08:55

## 2018-09-06 RX ADMIN — POTASSIUM CHLORIDE 40 MEQ: 20 TABLET, EXTENDED RELEASE ORAL at 01:33

## 2018-09-06 RX ADMIN — POTASSIUM CHLORIDE 40 MEQ: 20 TABLET, EXTENDED RELEASE ORAL at 09:59

## 2018-09-06 RX ADMIN — METOPROLOL TARTRATE 50 MG: 50 TABLET, FILM COATED ORAL at 09:59

## 2018-09-06 RX ADMIN — GLIMEPIRIDE 4 MG: 2 TABLET ORAL at 06:07

## 2018-09-06 NOTE — PROGRESS NOTES
2200 Waiting on lab results (k and mg) Resident made aware that patient was not discharged d/t electrolyte replacement. 2330 Critical potassium level of 2.9 received.  Spoke with obs resident and received orders to replace both critical k as well as Mg of 1.7 Thermon Limes

## 2018-09-06 NOTE — PROGRESS NOTES
OBS/CDU   RESIDENT NOTE      Patients PCP is: No primary care provider on file. SUBJECTIVE      No acute events overnight. Patient had low potassium levels yesterday, and was held for potassium replacement. Potassium levels this morning are near normal, and he'll get another dose of oral potassium replacement. Has been able to tolerate a full diet without nausea or vomiting. The patient is urinating on his own and is passing flatus. Denies fever, chills, nausea, vomiting, chest pain, shortness of breath, abdominal pain, focal weakness, numbness, tingling, urinary/bowel symptoms, vision changes, visual hallucinations, or headache. PHYSICAL EXAM      General: NAD, AO X 3  Heent: EMOI, PERRL  Neck: SUPPLE, NO JVD  Cardiovascular: RRR, S1S2  Pulmonary: CTAB, NO SOB  Abdomen: SOFT, NTTP, ND, +BS  Extremities: No calf tenderness or palpation. No lower extremity edema. Neuro / Psych: NO NUMBNESS OR TINGLING, MENTATION AT BASELINE    PERTINENT TEST /EXAMS      I have reviewed all available laboratory results.     MEDICATIONS CURRENT     metoprolol tartrate (LOPRESSOR) tablet 50 mg BID   potassium chloride 10 mEq/100 mL IVPB (Peripheral Line) PRN   fentaNYL (SUBLIMAZE) injection 25 mcg Q5 Min PRN   morphine injection 2 mg Q5 Min PRN   labetalol (NORMODYNE;TRANDATE) injection 5 mg Q10 Min PRN   polyethylene glycol (GoLYTELY) solution 2,000 mL Once   sodium chloride flush 0.9 % injection 10 mL 2 times per day   sodium chloride flush 0.9 % injection 10 mL PRN   amLODIPine (NORVASC) tablet 5 mg Daily   cyclobenzaprine (FLEXERIL) tablet 5 mg TID PRN   furosemide (LASIX) tablet 40 mg BID   glimepiride (AMARYL) tablet 4 mg QAM AC   lisinopril (PRINIVIL;ZESTRIL) tablet 20 mg Daily   pantoprazole (PROTONIX) tablet 40 mg QAM AC   sodium chloride flush 0.9 % injection 10 mL 2 times per day   sodium chloride flush 0.9 % injection 10 mL PRN   acetaminophen (TYLENOL) tablet 650 mg Q4H PRN   morphine injection 2 mg Q2H

## 2018-09-06 NOTE — PROGRESS NOTES
Gastroenterology Note      Patient:   Heather Curran   :    1945   Facility:   [facility]   Date:     2018  Consultant:   Dr. Misty Galeana MD      Subjective:     81 y.o. male admitted 9/3/2018 with Abdominal distention [R14.0], Abdominal pain [R10.9] and seen for abnormal CT results  Showing mild gastric prominence of the colon without distension, narrowing of proximal sigmoid colon -- possible developing mass. No acute events overnight. S/p colonoscopy on 2018. Tolerated the procedure well. 5 mm sessile polyp and 3 mm sessile polyp found and removed. Non bleeding small internal hemorrhoids also noted. Patient was resting comfortably today. No nausea, vomiting or abdominal pain. Tolerating diet. 1 small BM last night. Afebrile. VS stable. Objective:   Vital Signs:  /70   Pulse 84   Temp 97.9 °F (36.6 °C) (Oral)   Resp 14   Ht 5' 9\" (1.753 m)   Wt 207 lb 8 oz (94.1 kg)   SpO2 96%   BMI 30.64 kg/m²        Current medications:    Scheduled Meds:   polyethylene glycol  2,000 mL Oral Once    sodium chloride flush  10 mL Intravenous 2 times per day    amLODIPine  5 mg Oral Daily    furosemide  40 mg Oral BID    glimepiride  4 mg Oral QAM AC    lisinopril  20 mg Oral Daily    pantoprazole  40 mg Oral QAM AC    sodium chloride flush  10 mL Intravenous 2 times per day     Continuous Infusions:  PRN Meds:.potassium chloride, fentanNYL, morphine, labetalol, sodium chloride flush, cyclobenzaprine, sodium chloride flush, acetaminophen, morphine **OR** morphine, oxyCODONE-acetaminophen **OR** oxyCODONE-acetaminophen, ondansetron      Physical Exam:   General appearance: Alert, No acute distress  Lungs: Respirations easy and regular without use of accessory muscles. Lungs clear. Heart: S1S2 - regular rhythm  Abdomen: Soft, NT, ND +BS, no guarding or rigidity. No palpable masses  Skin:  No jaundice, no stigmata of chronic liver disease.   Neuro: Awake,

## 2018-09-06 NOTE — PROGRESS NOTES
Script for flagyl called into his pharmacy ,  [address] [facility] in [place], [place]   Notified patient and daughter by phone to  script  Indicated they would

## 2018-09-07 LAB — SURGICAL PATHOLOGY REPORT: NORMAL

## 2018-09-17 NOTE — DISCHARGE SUMMARY
metoprolol tartrate (LOPRESSOR) 50 MG tablet  Take 50 mg by mouth 2 times daily             omeprazole (PRILOSEC) 20 MG capsule  Take 20 mg by mouth daily.                  Diet:    , Advance as tolerated     Activity:  As tolerated    Consultants: IP CONSULT TO GENERAL SURGERY  IP CONSULT TO INTERNAL MEDICINE  IP CONSULT TO GI  IP CONSULT TO IV TEAM    Procedures:  Not indicated     Diagnostic Test:   Results for orders placed or performed during the hospital encounter of 20/70/46   Basic Metabolic Panel   Result Value Ref Range    Glucose 216 (H) 70 - 99 mg/dL    BUN 8 8 - 23 mg/dL    CREATININE 0.55 (L) 0.70 - 1.20 mg/dL    Bun/Cre Ratio NOT REPORTED 9 - 20    Calcium 10.0 8.6 - 10.4 mg/dL    Sodium 140 135 - 144 mmol/L    Potassium 3.4 (L) 3.7 - 5.3 mmol/L    Chloride 101 98 - 107 mmol/L    CO2 23 20 - 31 mmol/L    Anion Gap 16 9 - 17 mmol/L    GFR Non-African American >60 >60 mL/min    GFR African American >60 >60 mL/min    GFR Comment          GFR Staging NOT REPORTED    CBC Auto Differential   Result Value Ref Range    WBC 11.1 3.5 - 11.3 k/uL    RBC 5.56 4.21 - 5.77 m/uL    Hemoglobin 14.1 13.0 - 17.0 g/dL    Hematocrit 44.6 40.7 - 50.3 %    MCV 80.2 (L) 82.6 - 102.9 fL    MCH 25.4 25.2 - 33.5 pg    MCHC 31.6 28.4 - 34.8 g/dL    RDW 17.2 (H) 11.8 - 14.4 %    Platelets 096 345 - 869 k/uL    MPV 10.1 8.1 - 13.5 fL    NRBC Automated 0.0 0.0 per 100 WBC    Differential Type NOT REPORTED     Seg Neutrophils 78 (H) 36 - 65 %    Lymphocytes 14 (L) 24 - 43 %    Monocytes 3 3 - 12 %    Eosinophils % 4 1 - 4 %    Basophils 1 0 - 2 %    Immature Granulocytes 0 0 %    Segs Absolute 8.79 (H) 1.50 - 8.10 k/uL    Absolute Lymph # 1.51 1.10 - 3.70 k/uL    Absolute Mono # 0.35 0.10 - 1.20 k/uL    Absolute Eos # 0.41 0.00 - 0.44 k/uL    Basophils # 0.06 0.00 - 0.20 k/uL    Absolute Immature Granulocyte <0.03 0.00 - 0.30 k/uL    WBC Morphology NOT REPORTED     RBC Morphology ANISOCYTOSIS PRESENT     Platelet Estimate NOT

## (undated) DEVICE — FORCEPS BX L240CM JAW DIA22MM ORNG S STL MICROMESH STD CAP